# Patient Record
Sex: FEMALE | URBAN - METROPOLITAN AREA
[De-identification: names, ages, dates, MRNs, and addresses within clinical notes are randomized per-mention and may not be internally consistent; named-entity substitution may affect disease eponyms.]

---

## 2024-11-04 ENCOUNTER — APPOINTMENT (OUTPATIENT)
Dept: URBAN - METROPOLITAN AREA CLINIC 306 | Age: 49
Setting detail: DERMATOLOGY
End: 2024-11-04

## 2024-11-04 DIAGNOSIS — L72.8 OTHER FOLLICULAR CYSTS OF THE SKIN AND SUBCUTANEOUS TISSUE: ICD-10-CM

## 2024-11-04 PROBLEM — D23.39 OTHER BENIGN NEOPLASM OF SKIN OF OTHER PARTS OF FACE: Status: ACTIVE | Noted: 2024-11-04

## 2024-11-04 PROCEDURE — OTHER COUNSELING: OTHER

## 2024-11-04 PROCEDURE — OTHER ADDITIONAL NOTES: OTHER

## 2024-11-04 PROCEDURE — OTHER OBSERVATION AND MEASURE: OTHER

## 2024-11-04 PROCEDURE — 99202 OFFICE O/P NEW SF 15 MIN: CPT

## 2024-11-04 PROCEDURE — OTHER OBSERVATION: OTHER

## 2024-11-04 ASSESSMENT — LOCATION SIMPLE DESCRIPTION DERM
LOCATION SIMPLE: FRONTAL SCALP
LOCATION SIMPLE: RIGHT SCALP

## 2024-11-04 ASSESSMENT — LOCATION ZONE DERM: LOCATION ZONE: SCALP

## 2024-11-04 ASSESSMENT — LOCATION DETAILED DESCRIPTION DERM
LOCATION DETAILED: RIGHT MEDIAL FRONTAL SCALP
LOCATION DETAILED: MEDIAL FRONTAL SCALP

## 2024-11-04 NOTE — PROCEDURE: ADDITIONAL NOTES
Render Risk Assessment In Note?: no
Detail Level: Simple
Additional Notes: Will schedule with dr Byrd’s.
Additional Notes: Dr Byrd’s for removal

## 2024-11-21 ENCOUNTER — APPOINTMENT (OUTPATIENT)
Dept: URBAN - METROPOLITAN AREA CLINIC 306 | Age: 49
Setting detail: DERMATOLOGY
End: 2024-12-01

## 2024-11-21 DIAGNOSIS — L72.8 OTHER FOLLICULAR CYSTS OF THE SKIN AND SUBCUTANEOUS TISSUE: ICD-10-CM

## 2024-11-21 PROCEDURE — 11442 EXC FACE-MM B9+MARG 1.1-2 CM: CPT

## 2024-11-21 PROCEDURE — OTHER COUNSELING: OTHER

## 2024-11-21 PROCEDURE — 11443 EXC FACE-MM B9+MARG 2.1-3 CM: CPT

## 2024-11-21 PROCEDURE — OTHER EXCISION: OTHER

## 2024-11-21 PROCEDURE — 12052 INTMD RPR FACE/MM 2.6-5.0 CM: CPT

## 2024-11-21 PROCEDURE — 11441 EXC FACE-MM B9+MARG 0.6-1 CM: CPT

## 2024-11-21 ASSESSMENT — LOCATION DETAILED DESCRIPTION DERM
LOCATION DETAILED: RIGHT SUPERIOR FOREHEAD
LOCATION DETAILED: RIGHT MEDIAL FOREHEAD
LOCATION DETAILED: LEFT MEDIAL FOREHEAD

## 2024-11-21 ASSESSMENT — LOCATION SIMPLE DESCRIPTION DERM
LOCATION SIMPLE: LEFT FOREHEAD
LOCATION SIMPLE: RIGHT FOREHEAD

## 2024-11-21 ASSESSMENT — LOCATION ZONE DERM: LOCATION ZONE: FACE

## 2024-11-21 NOTE — PROCEDURE: EXCISION

## 2024-11-27 ENCOUNTER — APPOINTMENT (OUTPATIENT)
Dept: URBAN - METROPOLITAN AREA CLINIC 306 | Age: 49
Setting detail: DERMATOLOGY
End: 2024-12-02

## 2024-11-27 DIAGNOSIS — Z48.02 ENCOUNTER FOR REMOVAL OF SUTURES: ICD-10-CM

## 2024-11-27 PROCEDURE — OTHER SUTURE REMOVAL (GLOBAL PERIOD): OTHER

## 2024-11-27 ASSESSMENT — LOCATION DETAILED DESCRIPTION DERM
LOCATION DETAILED: RIGHT MEDIAL FOREHEAD
LOCATION DETAILED: RIGHT SUPERIOR MEDIAL FOREHEAD
LOCATION DETAILED: LEFT MEDIAL FOREHEAD

## 2024-11-27 ASSESSMENT — LOCATION SIMPLE DESCRIPTION DERM
LOCATION SIMPLE: LEFT FOREHEAD
LOCATION SIMPLE: RIGHT FOREHEAD

## 2024-11-27 ASSESSMENT — LOCATION ZONE DERM: LOCATION ZONE: FACE

## 2024-11-27 NOTE — PROCEDURE: SUTURE REMOVAL (GLOBAL PERIOD)
Detail Level: Detailed
Add 31005 Cpt? (Important Note: In 2017 The Use Of 16071 Is Being Tracked By Cms To Determine Future Global Period Reimbursement For Global Periods): no